# Patient Record
Sex: MALE | Race: OTHER | HISPANIC OR LATINO | URBAN - METROPOLITAN AREA
[De-identification: names, ages, dates, MRNs, and addresses within clinical notes are randomized per-mention and may not be internally consistent; named-entity substitution may affect disease eponyms.]

---

## 2023-08-26 ENCOUNTER — EMERGENCY (EMERGENCY)
Facility: HOSPITAL | Age: 33
LOS: 1 days | Discharge: HOME | End: 2023-08-26
Admitting: EMERGENCY MEDICINE
Payer: COMMERCIAL

## 2023-08-26 VITALS
HEART RATE: 55 BPM | OXYGEN SATURATION: 98 % | DIASTOLIC BLOOD PRESSURE: 73 MMHG | WEIGHT: 139.99 LBS | TEMPERATURE: 98 F | SYSTOLIC BLOOD PRESSURE: 120 MMHG | RESPIRATION RATE: 18 BRPM

## 2023-08-26 PROCEDURE — 99283 EMERGENCY DEPT VISIT LOW MDM: CPT

## 2023-08-26 RX ORDER — CLOTRIMAZOLE AND BETAMETHASONE DIPROPIONATE 10; .5 MG/G; MG/G
1 CREAM TOPICAL
Qty: 30 | Refills: 0
Start: 2023-08-26 | End: 2023-09-01

## 2023-08-26 NOTE — ED PROVIDER NOTE - NSFOLLOWUPCLINICS_GEN_ALL_ED_FT
[facility]  Dermatology  [address] [place] [id number]  Phone: [phone]  Fax: [phone]  Follow Up Time: 1-3 Days

## 2023-08-26 NOTE — ED PROVIDER NOTE - NSFOLLOWUPINSTRUCTIONS_ED_ALL_ED_FT
-Discharge home with cream, keep the skin cool and dry, see pcp or dermatologist for follow up in 2 days, return to the ER for any new or worsening signs or symptoms.

## 2023-08-26 NOTE — ED ADULT NURSE NOTE - NSFALLLASTSIX_ED_ALL_ED
Opioid Counseling: I discussed with the patient the potential side effects of opioids including but not limited to addiction, altered mental status, and depression. I stressed avoiding alcohol, benzodiazepines, muscle relaxants and sleep aids unless specifically okayed by a physician. The patient verbalized understanding of the proper use and possible adverse effects of opioids. All of the patient's questions and concerns were addressed. They were instructed to flush the remaining pills down the toilet if they did not need them for pain. No.

## 2023-08-26 NOTE — ED PROVIDER NOTE - SKIN, MLM
Rt. thigh: +visible skin lichenification papule rash noted with no regional lymphenapathy, no cellulitis/streaking/ulcer, no bullseye/target lesion.

## 2023-08-26 NOTE — ED PROVIDER NOTE - PATIENT PORTAL LINK FT
You can access the FollowMyHealth Patient Portal offered by [facility] by registering at the following website: [email or web address]. By joining [employer]’s FollowMyHealth portal, you will also be able to view your health information using other applications (apps) compatible with our system.

## 2023-08-26 NOTE — ED ADULT TRIAGE NOTE - BP NONINVASIVE SYSTOLIC (MM HG)
Eyes closed.  Lethargic. Dr. Saleh at bedside.  Awakes with tactile stimuli.  Oriented.  Speech slurred.  Family member at bedside.   120

## 2023-08-26 NOTE — ED PROVIDER NOTE - OBJECTIVE STATEMENT
33 y/o male, no significant pmh, nkda, c/o rash on the rt. thigh x 1 week after sweating.  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 thigh, no fever or chills, no pain, admits scratching it, no thigh pain or leg swelling, no numbness/tingling, no dizziness, no other medical or psychological complaints.

## 2023-08-29 DIAGNOSIS — R21 RASH AND OTHER NONSPECIFIC SKIN ERUPTION: ICD-10-CM

## 2024-05-23 NOTE — ED PROVIDER NOTE - ENMT NEGATIVE STATEMENT, MLM
Patient presents with right eye problem. Reports pain. Reports right eyelid swelling. Reports drainage that is clear. No vision changes. Denies visual acuity test.  Symptoms started yesterday.  OTCs used: none    Patient would like communication of their results via:      Cell Phone:   Telephone Information:   Mobile [phone]   Mobile [phone]     Okay to leave a message containing results? Yes      Writer was wearing level 3 procedure mask during rooming process.       Ears: no ear pain and no hearing problems. Nose: no nasal congestion and no nasal drainage. Mouth/Throat: no dysphagia, no hoarseness and no throat pain. Neck: no lumps, no pain, no stiffness and no swollen glands.